# Patient Record
Sex: MALE | Race: WHITE | NOT HISPANIC OR LATINO | Employment: OTHER | URBAN - METROPOLITAN AREA
[De-identification: names, ages, dates, MRNs, and addresses within clinical notes are randomized per-mention and may not be internally consistent; named-entity substitution may affect disease eponyms.]

---

## 2018-05-24 ENCOUNTER — ESTABLISHED COMPREHENSIVE EXAM (OUTPATIENT)
Dept: URBAN - METROPOLITAN AREA CLINIC 40 | Facility: CLINIC | Age: 83
End: 2018-05-24

## 2018-05-24 DIAGNOSIS — H43.393: ICD-10-CM

## 2018-05-24 DIAGNOSIS — H02.833: ICD-10-CM

## 2018-05-24 DIAGNOSIS — H53.453: ICD-10-CM

## 2018-05-24 DIAGNOSIS — H02.836: ICD-10-CM

## 2018-05-24 DIAGNOSIS — H26.491: ICD-10-CM

## 2018-05-24 PROCEDURE — 92014 COMPRE OPH EXAM EST PT 1/>: CPT

## 2018-05-24 PROCEDURE — 92015 DETERMINE REFRACTIVE STATE: CPT

## 2018-05-24 PROCEDURE — G8427 DOCREV CUR MEDS BY ELIG CLIN: HCPCS

## 2018-05-24 PROCEDURE — 1036F TOBACCO NON-USER: CPT

## 2018-05-24 ASSESSMENT — KERATOMETRY
OD_AXISANGLE_DEGREES: 013
OD_K1POWER_DIOPTERS: 44.50

## 2018-05-24 ASSESSMENT — VISUAL ACUITY
OU_SC: J1
OS_SC: J1
OD_SC: J1
OS_SC: 20/30-1
OU_SC: 20/25-1
OD_SC: 20/30+2

## 2018-05-24 ASSESSMENT — TONOMETRY
OS_IOP_MMHG: 16
OD_IOP_MMHG: 17

## 2018-12-26 ENCOUNTER — TECH ONLY (OUTPATIENT)
Dept: URBAN - METROPOLITAN AREA CLINIC 39 | Facility: CLINIC | Age: 83
End: 2018-12-26

## 2018-12-26 DIAGNOSIS — H53.453: ICD-10-CM

## 2018-12-26 PROCEDURE — 99211T TECH SERVICE

## 2018-12-26 PROCEDURE — 92083 EXTENDED VISUAL FIELD XM: CPT

## 2018-12-26 ASSESSMENT — KERATOMETRY
OD_K1POWER_DIOPTERS: 44.50
OD_AXISANGLE_DEGREES: 013

## 2021-04-12 NOTE — PATIENT DISCUSSION
It was discussed and explained that monovision is a compromise and that vision will be limited during certain activities. Activities include: driving at night and near tasks that require good depth perception. 36.6

## 2021-04-12 NOTE — PATIENT DISCUSSION
New Prescription: Pred Forte (prednisolone acetate): drops,suspension: 1% 1 drop four times a day into both eyes 04-

## 2021-04-21 NOTE — PATIENT DISCUSSION
Continue: Pred Forte (prednisolone acetate): drops,suspension: 1% 1 drop four times a day into both eyes 04-

## 2021-04-21 NOTE — PATIENT DISCUSSION
MYOPIA, OU- DISC OPT OF REFRACTIVE SX-VS-GLS/[id number]. PT UNDERSTANDS OPTIONS AND DESIRES TO PROCEED WITH LASIK OU TO REDUCE DEPENDENCY ON GLS/CTLS.

## 2021-05-17 NOTE — PATIENT DISCUSSION
Stopped Today: Pred Forte (prednisolone acetate): drops,suspension: 1% 1 drop four times a day into both eyes 04-
